# Patient Record
Sex: MALE | Race: BLACK OR AFRICAN AMERICAN
[De-identification: names, ages, dates, MRNs, and addresses within clinical notes are randomized per-mention and may not be internally consistent; named-entity substitution may affect disease eponyms.]

---

## 2018-01-16 ENCOUNTER — HOSPITAL ENCOUNTER (OUTPATIENT)
Dept: HOSPITAL 45 - C.MRI | Age: 23
Discharge: HOME | End: 2018-01-16
Attending: ORTHOPAEDIC SURGERY
Payer: COMMERCIAL

## 2018-01-16 DIAGNOSIS — R60.0: ICD-10-CM
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
## 2018-01-16 DIAGNOSIS — S80.01XA: ICD-10-CM

## 2018-01-16 DIAGNOSIS — M25.561: Primary | ICD-10-CM

## 2018-01-16 NOTE — DIAGNOSTIC IMAGING REPORT
R LOWER EXT JOINT WITHOUT



CLINICAL HISTORY: RT KNEE PAIN pain



TECHNIQUE: Multi axial MRI acquisition



COMPARISON STUDY:  None



FINDINGS: Bone contusion of the mid to inferior patella. Nonspecific fat

edematous change of the infrapatellar fat pad medially posterior to the inferior

patellar tendon/ligament.



The patellar articular surface appears to be generally intact with only a trace

amount of chondromalacia. Medial lateral patellar retinaculum appear intact.

Area there is only a trace amount of joint fluid. There is no significant

popliteal cyst.



The anterior and posterior cruciate ligaments are intact.



The collateral ligaments are intact.



Evaluation of menisci show no evidence for major major meniscal tear. Cruciate

ligaments again are intact.



IMPRESSION:  

1. Contusion of the mid to inferior patella.

2. Edema of the infrapatellar fat

3. Minimal chondral malacia patella.

4. Study is otherwise unremarkable. 









The above report was generated using voice recognition software.  It may contain

grammatical, syntax or spelling errors.







Electronically signed by:  Jermain Burns M.D.

1/16/2018 9:38 PM



Dictated Date/Time:  1/16/2018 9:34 PM